# Patient Record
Sex: MALE | Race: BLACK OR AFRICAN AMERICAN | NOT HISPANIC OR LATINO | URBAN - METROPOLITAN AREA
[De-identification: names, ages, dates, MRNs, and addresses within clinical notes are randomized per-mention and may not be internally consistent; named-entity substitution may affect disease eponyms.]

---

## 2019-07-22 ENCOUNTER — EMERGENCY (EMERGENCY)
Facility: HOSPITAL | Age: 54
LOS: 0 days | Discharge: HOME | End: 2019-07-22
Attending: STUDENT IN AN ORGANIZED HEALTH CARE EDUCATION/TRAINING PROGRAM
Payer: SELF-PAY

## 2019-07-22 VITALS
OXYGEN SATURATION: 98 % | DIASTOLIC BLOOD PRESSURE: 97 MMHG | HEART RATE: 79 BPM | TEMPERATURE: 98 F | RESPIRATION RATE: 18 BRPM | SYSTOLIC BLOOD PRESSURE: 155 MMHG

## 2019-07-22 VITALS
HEIGHT: 69 IN | OXYGEN SATURATION: 98 % | WEIGHT: 169.98 LBS | DIASTOLIC BLOOD PRESSURE: 76 MMHG | HEART RATE: 90 BPM | RESPIRATION RATE: 17 BRPM | SYSTOLIC BLOOD PRESSURE: 120 MMHG

## 2019-07-22 DIAGNOSIS — Z88.0 ALLERGY STATUS TO PENICILLIN: ICD-10-CM
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
## 2019-07-22 DIAGNOSIS — F10.129 ALCOHOL ABUSE WITH INTOXICATION, UNSPECIFIED: ICD-10-CM

## 2019-07-22 DIAGNOSIS — S40.812A ABRASION OF LEFT UPPER ARM, INITIAL ENCOUNTER: ICD-10-CM

## 2019-07-22 LAB — ETHANOL SERPL-MCNC: 332 MG/DL — HIGH (ref 0–10)

## 2019-07-22 PROCEDURE — 99284 EMERGENCY DEPT VISIT MOD MDM: CPT

## 2019-07-22 PROCEDURE — 99053 MED SERV 10PM-8AM 24 HR FAC: CPT

## 2019-07-22 NOTE — ED PROVIDER NOTE - OBJECTIVE STATEMENT
presents intoxicated  states he drank alot  for his bday yesterday   +abrasion left arm  denies head injury 54 year old male presents today intoxicated, admits to drinking a lot for his birthday yesterday, +left arm abrasion, denies head injury (-) chest pain (-) sob

## 2019-07-22 NOTE — ED ADULT NURSE REASSESSMENT NOTE - NS ED NURSE REASSESS COMMENT FT1
Pt is alert and oriented able to ambulate with a steady gait . He was taken to CT scan and left before test completed He return to ed states he is ready to go home and has a steady and is alert
Observed sleeping, respiration unlabored and spontaneous

## 2019-07-22 NOTE — ED ADULT TRIAGE NOTE - CHIEF COMPLAINT QUOTE
Patient BIBA: patient was found intoxicated on train platform: patient states "drank 3 pints of vodka. for my Birthday." Denies other intoxicants. Patient loud and cursing aloud in triage. Unable to ambulate steadily

## 2019-07-22 NOTE — ED PROVIDER NOTE - PROGRESS NOTE DETAILS
sign out from dr lorenzo, upon my assessment, pt clinically sober. he is declining ct head and says there is nothing bothering him. he walks stably and communicates clearly. he wants to leave [facility] to meet his brother. given he is clinically sober and demostrates full capacity and reiterates understanding the possibility of missing the dx of a severe head injury. will dc home as he requests we do.

## 2019-07-22 NOTE — ED PROVIDER NOTE - CARE PLAN
Principal Discharge DX:	Alcohol intoxication Principal Discharge DX:	Alcohol intoxication  Secondary Diagnosis:	Arm abrasion, left, initial encounter

## 2019-07-22 NOTE — ED ADULT NURSE NOTE - OBJECTIVE STATEMENT
Patient BIBA: patient was found intoxicated on train platform: patient states "drank 3 pints of vodka. for my Birthday." Denies other intoxicants. Patient loud and cursing aloud in triage. Unable to ambulate steadily. pt is sleeping comfortably at this time. pt presents to the ED c/o alcohol intoxication. Patient BIBEMS, patient was found intoxicated on train platform: patient states "drank 3 pints of vodka for my Birthday." Patient loud and cursing aloud in triage. pt is sleeping comfortably at this time.